# Patient Record
Sex: FEMALE | Race: WHITE | NOT HISPANIC OR LATINO | Employment: FULL TIME | URBAN - METROPOLITAN AREA
[De-identification: names, ages, dates, MRNs, and addresses within clinical notes are randomized per-mention and may not be internally consistent; named-entity substitution may affect disease eponyms.]

---

## 2017-03-06 ENCOUNTER — HOSPITAL ENCOUNTER (OUTPATIENT)
Facility: MEDICAL CENTER | Age: 27
End: 2017-03-06
Attending: PHYSICIAN ASSISTANT
Payer: COMMERCIAL

## 2017-03-06 ENCOUNTER — OFFICE VISIT (OUTPATIENT)
Dept: URGENT CARE | Facility: PHYSICIAN GROUP | Age: 27
End: 2017-03-06
Payer: COMMERCIAL

## 2017-03-06 VITALS
TEMPERATURE: 98.8 F | BODY MASS INDEX: 20.89 KG/M2 | HEART RATE: 84 BPM | SYSTOLIC BLOOD PRESSURE: 102 MMHG | DIASTOLIC BLOOD PRESSURE: 74 MMHG | WEIGHT: 130 LBS | HEIGHT: 66 IN | RESPIRATION RATE: 16 BRPM | OXYGEN SATURATION: 97 %

## 2017-03-06 DIAGNOSIS — R10.30 LOWER ABDOMINAL PAIN: ICD-10-CM

## 2017-03-06 DIAGNOSIS — Z3A.01 LESS THAN 8 WEEKS GESTATION OF PREGNANCY: ICD-10-CM

## 2017-03-06 LAB
APPEARANCE UR: CLEAR
BILIRUB UR STRIP-MCNC: NEGATIVE MG/DL
COLOR UR AUTO: YELLOW
GLUCOSE UR STRIP.AUTO-MCNC: NEGATIVE MG/DL
INT CON NEG: NEGATIVE
INT CON POS: POSITIVE
KETONES UR STRIP.AUTO-MCNC: NEGATIVE MG/DL
LEUKOCYTE ESTERASE UR QL STRIP.AUTO: NEGATIVE
NITRITE UR QL STRIP.AUTO: NEGATIVE
PH UR STRIP.AUTO: 6 [PH] (ref 5–8)
POC URINE PREGNANCY TEST: POSITIVE
PROT UR QL STRIP: NEGATIVE MG/DL
RBC UR QL AUTO: NEGATIVE
SP GR UR STRIP.AUTO: 1
UROBILINOGEN UR STRIP-MCNC: NEGATIVE MG/DL

## 2017-03-06 PROCEDURE — 99214 OFFICE O/P EST MOD 30 MIN: CPT | Performed by: PHYSICIAN ASSISTANT

## 2017-03-06 PROCEDURE — 81025 URINE PREGNANCY TEST: CPT | Performed by: PHYSICIAN ASSISTANT

## 2017-03-06 PROCEDURE — 81002 URINALYSIS NONAUTO W/O SCOPE: CPT | Performed by: PHYSICIAN ASSISTANT

## 2017-03-06 PROCEDURE — 87086 URINE CULTURE/COLONY COUNT: CPT

## 2017-03-06 NOTE — MR AVS SNAPSHOT
"        Padmaja Berman   3/6/2017 6:15 PM   Office Visit   MRN: [id number]    Department:  [facility]   Dept Phone:  [phone]    Description:  Female : 1990   Provider:  Marge Hodgson PA-C           Reason for Visit     Urinary Pain Lower abdominal pain, slight pain from urinating. x1 week off and on      Allergies as of 3/6/2017     Allergen Noted Reactions    Nkda [No Known Drug Allergy] 2011         You were diagnosed with     Lower abdominal pain   [[id number]]         Vital Signs     Blood Pressure Pulse Temperature Respirations Height Weight    102/74 mmHg 84 37.1 °C (98.8 °F) 16 1.676 m (5' 5.98\") 58.968 kg (130 lb)    Body Mass Index Oxygen Saturation Last Menstrual Period Breastfeeding? Smoking Status       20.99 kg/m2 97% 2011 No Never Smoker        Basic Information     Date Of Birth Sex Race Ethnicity Preferred Language    1990 Female White Non- English      Problem List              ICD-10-CM Priority Class Noted - Resolved    Encounter for supervision of other normal pregnancy Z34.80   2011 - Present    Short cervix affecting pregnancy O26.879   10/21/2011 - Present    Thrombocytopenia complicating pregnancy (CMS-HCC) O99.119, D69.6   2011 - Present    Pyelonephritis N12   9/15/2016 - Present    Anxiety F41.9   2016 - Present      Health Maintenance        Date Due Completion Dates    IMM HPV VACCINE (1 of 3 - Female 3 Dose Series) 2002 ---    IMM VARICELLA (CHICKENPOX) VACCINE (1 of 2 - 2 Dose Adolescent Series) 2004 ---    IMM HEP A VACCINE (2 of 2 - Standard Series) 2009    PAP SMEAR 2012    IMM DTaP/Tdap/Td Vaccine (7 - Td) 2016, 1996, 8/3/1993, 11/10/1992, 1992, 1991    IMM INFLUENZA (1) 2016 ---            Results     POCT Urinalysis      Component Value Standard Range & Units    POC Color Yellow Negative    POC Appearance Clear Negative    POC Leukocyte " Esterase Negative Negative    POC Nitrites Negative Negative    POC Urobiligen Negative Negative (0.2) mg/dL    POC Protein Negative Negative mg/dL    POC Urine PH 6.0 5.0 - 8.0    POC Blood Negative Negative    POC Specific Gravity 1.005 <1.005 - >1.030    POC Ketones Negative Negative mg/dL    POC Biliruben Negative Negative mg/dL    POC Glucose Negative Negative mg/dL                POCT Pregnancy      Component Value Standard Range & Units    POC Urine Pregnancy Test Positive Negative    Internal Control Positive Positive     Internal Control Negative Negative                         Current Immunizations     Dtap Vaccine 5/2/1996, 8/3/1993, 11/10/1992, 4/1/1992, 1990    HIB Vaccine (ACTHIB/HIBERIX) 4/26/1993, 11/10/1992, 4/1/1992, 1991    Hepatitis A Vaccine, Ped/Adol 8/14/2008    Hepatitis B Vaccine Non-Recombivax (Ped/Adol) 4/21/2004, 1/20/2004, 10/16/2003    MMR Vaccine 5/2/1996, 4/26/1993    OPV - Historical Data 5/2/1996, 4/26/1993, 4/1/1992, 1991    Tdap Vaccine 5/12/2006      Below and/or attached are the medications your provider expects you to take. Review all of your home medications and newly ordered medications with your provider and/or pharmacist. Follow medication instructions as directed by your provider and/or pharmacist. Please keep your medication list with you and share with your provider. Update the information when medications are discontinued, doses are changed, or new medications (including over-the-counter products) are added; and carry medication information at all times in the event of emergency situations     Allergies:  NKDA - (reactions not documented)               Medications  Valid as of: March 06, 2017 -  7:56 PM    Generic Name Brand Name Tablet Size Instructions for use    ALPRAZolam (Tab) XANAX 0.25 MG Take 1 Tab by mouth at bedtime as needed for Sleep or Anxiety.        Ginger (Zingiber officinalis)   Take  by mouth.        Ibuprofen (Tab) MOTRIN 800 MG  Take 1 Tab by mouth every 8 hours as needed (Cramping).        Ondansetron HCl (Tab) ZOFRAN 4 MG Take 1 Tab by mouth every four hours as needed for Nausea/Vomiting.        OxyCODONE HCl (Tab) ROXICODONE 5 MG Take 1 Tab by mouth every four hours as needed for Severe Pain.        Phenazopyridine HCl (Tab) PYRIDIUM 200 MG Take 1 Tab by mouth 3 times a day as needed.        Prenatal Vit-Fe Fumarate-FA   Take  by mouth.        .                 Medicines prescribed today were sent to:     [facility] #[id number] - [place] - [address] [place] [address]    Phone: [phone] Fax: [phone]    Open 24 Hours?: No      Medication refill instructions:       If your prescription bottle indicates you have medication refills left, it is not necessary to call your provider’s office. Please contact your pharmacy and they will refill your medication.    If your prescription bottle indicates you do not have any refills left, you may request refills at any time through one of the following ways: The online [employer] system (except Urgent Care), by calling your provider’s office, or by asking your pharmacy to contact your provider’s office with a refill request. Medication refills are processed only during regular business hours and may not be available until the next business day. Your provider may request additional information or to have a follow-up visit with you prior to refilling your medication.   *Please Note: Medication refills are assigned a new Rx number when refilled electronically. Your pharmacy may indicate that no refills were authorized even though a new prescription for the same medication is available at the pharmacy. Please request the medicine by name with the pharmacy before contacting your provider for a refill.        Your To Do List     Future Labs/Procedures Complete By Expires    URINE CULTURE(NEW)  As directed 3/6/2018    US-OB PELVIS TRANSVAGINAL  As directed 3/6/2018         [employer]  Access Code: [email or web address]  Expires: 3/31/2017  2:30 PM    [employer]  A secure, online tool to manage your health information     Your [employer]’s [employer]® is a secure, online tool that connects you to your personalized health information from the privacy of your home -- day or night - making it very easy for you to manage your healthcare. Once the activation process is completed, you can even access your medical information using the [employer] valeriano, which is available for free in the Apple Valeriano store or Google Play store.     [employer] provides the following levels of access (as shown below):   My Chart Features   [facility] Primary Care Doctor [facility]  Specialists [facility]  Urgent  Care Non-[facility]  Primary Care  Doctor   Email your healthcare team securely and privately 24/7 X X X    Manage appointments: schedule your next appointment; view details of past/upcoming appointments X      Request prescription refills. X      View recent personal medical records, including lab and immunizations X X X X   View health record, including health history, allergies, medications X X X X   Read reports about your outpatient visits, procedures, consult and ER notes X X X X   See your discharge summary, which is a recap of your hospital and/or ER visit that includes your diagnosis, lab results, and care plan. X X       How to register for [employer]:  1. Go to  [email or web address].  2. Click on the Sign Up Now box, which takes you to the New Member Sign Up page. You will need to provide the following information:  a. Enter your [employer] Access Code exactly as it appears at the top of this page. (You will not need to use this code after you’ve completed the sign-up process. If you do not sign up before the expiration date, you must request a new code.)   b. Enter your date of birth.   c. Enter your home email address.   d. Click Submit, and follow the next screen’s instructions.  3. Create a [employer] ID. This will be your [employer] login ID  and cannot be changed, so think of one that is secure and easy to remember.  4. Create a [employer]fy password. You can change your password at any time.  5. Enter your Password Reset Question and Answer. This can be used at a later time if you forget your password.   6. Enter your e-mail address. This allows you to receive e-mail notifications when new information is available in simfy.  7. Click Sign Up. You can now view your health information.    For assistance activating your [employer] account, call [phone]

## 2017-03-07 ENCOUNTER — HOSPITAL ENCOUNTER (OUTPATIENT)
Dept: RADIOLOGY | Facility: MEDICAL CENTER | Age: 27
End: 2017-03-07
Attending: PHYSICIAN ASSISTANT
Payer: COMMERCIAL

## 2017-03-07 DIAGNOSIS — R10.30 LOWER ABDOMINAL PAIN: ICD-10-CM

## 2017-03-07 PROCEDURE — 76817 TRANSVAGINAL US OBSTETRIC: CPT

## 2017-03-07 NOTE — PROGRESS NOTES
Chief Complaint   Patient presents with   • Urinary Pain     Lower abdominal pain, slight pain from urinating. x1 week off and on        HISTORY OF PRESENT ILLNESS: Patient is a 26 y.o. female who presents today for 1 week of intermittent/unpredictable lower abdominal pain/stabbing pain lasting about 5-10 mins at a time.  Patient is currently approx 8 weeks pregnant.  LMP end of December.   She states she is not having any urinary burning, urgency and frequency of urination but was worried particularly about UTI as she had one in the past that went undetected.  She is  and does not have hx of complication in her first pregnancy. .    She is not having any vaginal bleeding or spotting.   Some intermittent nausea.  No vomiting.   No changes in bowels.   First OB appt in 17.     Patient Active Problem List    Diagnosis Date Noted   • Anxiety 2016   • Pyelonephritis 09/15/2016   • Thrombocytopenia complicating pregnancy (CMS-HCC) 2011   • Short cervix affecting pregnancy 10/21/2011   • Encounter for supervision of other normal pregnancy 2011       Allergies:Nkda    Current Outpatient Prescriptions Ordered in [facility]   Medication Sig Dispense Refill   • Ginger, Zingiber officinalis, (GINGER PO) Take  by mouth.     • Prenatal Vit-Fe Fumarate-FA (BL PRENATAL VITAMINS PO) Take  by mouth.     • alprazolam (XANAX) 0.25 MG Tab Take 1 Tab by mouth at bedtime as needed for Sleep or Anxiety. 10 Tab 0   • oxycodone immediate-release (ROXICODONE) 5 MG Tab Take 1 Tab by mouth every four hours as needed for Severe Pain. 20 Tab 0   • ondansetron (ZOFRAN) 4 MG Tab tablet Take 1 Tab by mouth every four hours as needed for Nausea/Vomiting. 20 Tab 1   • phenazopyridine (PYRIDIUM) 200 MG Tab Take 1 Tab by mouth 3 times a day as needed. 6 Tab 0   • ibuprofen (MOTRIN) 800 MG TABS Take 1 Tab by mouth every 8 hours as needed (Cramping). 40 Each 3     No current Epic-ordered facility-administered medications on file.  "      Past Medical History   Diagnosis Date   • Thrombocytopenia complicating pregnancy 2011   • N&V (nausea and vomiting) 2011   • Pyelonephritis 9/15/2016   • Anxiety 2016       Social History   Substance Use Topics   • Smoking status: Never Smoker    • Smokeless tobacco: Never Used   • Alcohol Use: No       Family Status   Relation Status Death Age   • Maternal Grandfather Alive    • Mother Alive    • Father Alive    • Maternal Grandmother Alive    • Paternal Grandmother       Emphysema   • Paternal Grandfather Alive    • Sister Alive    • Brother Alive      Family History   Problem Relation Age of Onset   • Cancer Maternal Grandfather      skin   • Lung Disease Paternal Grandmother      Emphysema   • Heart Disease Paternal Grandfather      MI x 3       ROS:  Review of Systems   Constitutional: Negative for fever, chills, weight loss and malaise/fatigue.   HENT: Negative for ear pain, nosebleeds, congestion, sore throat and neck pain.    Eyes: Negative for blurred vision.   Respiratory: Negative for cough, sputum production, shortness of breath and wheezing.    Cardiovascular: Negative for chest pain, palpitations, orthopnea and leg swelling.   Gastrointestinal: SEE HPI  Genitourinary: SEE HPI  All other systems reviewed and are negative.       Exam:  Blood pressure 102/74, pulse 84, temperature 37.1 °C (98.8 °F), resp. rate 16, height 1.676 m (5' 5.98\"), weight 58.968 kg (130 lb), last menstrual period 2011, SpO2 97 %, not currently breastfeeding.  General:  Well nourished, well developed female in NAD  Eyes: PERRLA, EOM within normal limits, no conjunctival injection, no scleral icterus, visual fields and acuity grossly intact.  Mouth: reasonable hygiene, no erythema exudates or tonsillar enlargement.  Neck: no masses, range of motion within normal limits, no tracheal deviation. No lymphadenopathy  Pulmonary: Normal respiratory effort, no wheezes, crackles, or " rhonchi.  Cardiovascular: regular rate and rhythm without murmurs, rubs, or gallops.  Abdomen: Soft, nontender, nondistended. Normal bowel sounds. No hepatosplenomegaly or masses, or hernias. No rebound or guarding.  No CVA tenderness.   Skin: No visible rashes or lesion. Warm, pink, dry.   Extremities: no clubbing, cyanosis, or edema.  Neuro: A&O x 3. Speech normal/clear.  Normal gait.     Component Results      Component Value Ref Range & Units Status     POC Urine Pregnancy Test Positive Negative Final     Internal Control Positive Positive  Final     Internal Control Negative Negative  Final     Component Results      Component Value Ref Range & Units Status     POC Color Yellow Negative Final     POC Appearance Clear Negative Final     POC Leukocyte Esterase Negative Negative Final     POC Nitrites Negative Negative Final     POC Urobiligen Negative Negative (0.2) mg/dL Final     POC Protein Negative Negative mg/dL Final     POC Urine PH 6.0 5.0 - 8.0 Final     POC Blood Negative Negative Final     POC Specific Gravity 1.005 <1.005 - >1.030 Final     POC Ketones Negative Negative mg/dL Final     POC Biliruben Negative Negative mg/dL Final     POC Glucose Negative Negative mg/dL Final     Impression        1.  Single intrauterine pregnancy with a sonographic age of 7 weeks 3 days and an estimated date of delivery of 10/21/2017  2.  LEFT ovarian hemorrhagic or corpus luteum cyst         Reading Provider Reading Date     Ronn Israel M.D. Mar 7, 2017       Assessment/Plan:  1. Lower abdominal pain  POCT Urinalysis    POCT Pregnancy    US-OB PELVIS TRANSVAGINAL    URINE CULTURE(NEW)   2. Less than 8 weeks gestation of pregnancy         -labs/imaging as above.  IUP at 7 weeks, 3 days.   -spoke with patient following day 03/07/17 and she was denying pain that day.   Has appt with OB on 03/14/17 for initial visit.   -continue to stay well hydrated and monitor symptoms carefully.  ER precautions for increasing  pain/vaginal bleeding or spotting or any change in character of current symptoms     Supportive care, differential diagnoses, and indications for immediate follow-up discussed with patient.   Pathogenesis of diagnosis discussed including typical length and natural progression.   Instructed to return to clinic or nearest emergency department for any change in condition, further concerns, or worsening of symptoms.  Patient states understanding of the plan of care and discharge instructions.      Marge Hodgson PA-C

## 2017-03-09 LAB
BACTERIA UR CULT: NORMAL
SIGNIFICANT IND 70042: NORMAL
SOURCE SOURCE: NORMAL

## 2017-04-11 ENCOUNTER — HOSPITAL ENCOUNTER (OUTPATIENT)
Dept: LAB | Facility: MEDICAL CENTER | Age: 27
End: 2017-04-11
Attending: OBSTETRICS & GYNECOLOGY
Payer: COMMERCIAL

## 2017-04-11 PROCEDURE — 84702 CHORIONIC GONADOTROPIN TEST: CPT

## 2017-04-11 PROCEDURE — 84163 PAPPA SERUM: CPT

## 2017-04-11 PROCEDURE — 36415 COLL VENOUS BLD VENIPUNCTURE: CPT

## 2017-05-05 ENCOUNTER — HOSPITAL ENCOUNTER (OUTPATIENT)
Dept: LAB | Facility: MEDICAL CENTER | Age: 27
End: 2017-05-05
Attending: OBSTETRICS & GYNECOLOGY
Payer: COMMERCIAL

## 2017-05-05 PROCEDURE — 84702 CHORIONIC GONADOTROPIN TEST: CPT

## 2017-05-05 PROCEDURE — 82105 ALPHA-FETOPROTEIN SERUM: CPT

## 2017-05-05 PROCEDURE — 36415 COLL VENOUS BLD VENIPUNCTURE: CPT

## 2017-05-05 PROCEDURE — 86336 INHIBIN A: CPT

## 2017-05-05 PROCEDURE — 82677 ASSAY OF ESTRIOL: CPT

## 2017-05-08 LAB
# FETUSES US: 1
# FETUSES US: 1
ADDITIONAL US Z4552: NORMAL
ADDITIONAL US Z4552: NORMAL
AFP ADJ MOM SERPL: 0.92
AFP SERPL-MCNC: 31.6 NG/ML
AGE AT DELIVERY: 26.1 YEARS
AGE AT DELIVERY: 26.1 YEARS
COLLECT DATE: NORMAL
COLLECT DATE: NORMAL
COMMENT  Z4564: NORMAL
COMMENT  Z4564: NORMAL
FET CRL US.MEAS: 64 MM
FET CRL US.MEAS: 64 MM
FET NUCHAL FOLD MOM THICKNESS US.MEAS: 1.38
FET NUCHAL FOLD MOM THICKNESS US.MEAS: 1.38
FET NUCHAL FOLD THICKNESS US.MEAS: 2.3 MM
FET NUCHAL FOLD THICKNESS US.MEAS: 2.3 MM
FET TS 18 RISK FROM MAT AGE: NORMAL
FET TS 18 RISK FROM MAT AGE: NORMAL
FET TS 21 RISK FROM MAT AGE: NORMAL
FET TS 21 RISK FROM MAT AGE: NORMAL
GA: 12.7 WEEKS
GA: 12.7 WEEKS
GA: 16.1 WEEKS
HCG ADJ MOM SERPL: 1.22
HCG ADJ MOM SERPL: 1.35
HCG SERPL-ACNC: 127.9 IU/ML
HCG SERPL-ACNC: 46 IU/ML
IDDM PATIENT QL: NO
INHIBIN A ADJ MOM SERPL: 1.56
INHIBIN A SERPL-MCNC: 301.3 PG/ML
INTEGRATED SCN PATIENT-IMP: NORMAL
NEURAL TUBE DEFECT RISK FETUS: NORMAL %
NOTE Z4565: NORMAL
NOTE Z4565: NORMAL
PAPP-A MOM SERPL: 1.5
PAPP-A MOM SERPL: 1.5
PAPP-A SERPL-MCNC: 1839.3 NG/ML
PAPP-A SERPL-MCNC: 1839.3 NG/ML
RESULTS Z4535: NORMAL
RESULTS Z4535: NORMAL
SONOGRAPHER: NORMAL
SONOGRAPHER: NORMAL
SUBMIT PART2 SAMPLE USING Z4537: NORMAL
TS 18 RISK FETUS: NORMAL
TS 18 RISK FETUS: NORMAL
TS 21 RISK FETUS: NORMAL
TS 21 RISK FETUS: NORMAL
U ESTRIOL ADJ MOM SERPL: 1.18
U ESTRIOL SERPL-MCNC: 1.02 NG/ML
US DATE: NORMAL
US DATE: NORMAL

## 2018-06-27 ENCOUNTER — OFFICE VISIT (OUTPATIENT)
Dept: URGENT CARE | Facility: PHYSICIAN GROUP | Age: 28
End: 2018-06-27
Payer: COMMERCIAL

## 2018-06-27 VITALS
OXYGEN SATURATION: 97 % | RESPIRATION RATE: 18 BRPM | HEIGHT: 66 IN | WEIGHT: 135 LBS | DIASTOLIC BLOOD PRESSURE: 78 MMHG | TEMPERATURE: 99.6 F | HEART RATE: 74 BPM | BODY MASS INDEX: 21.69 KG/M2 | SYSTOLIC BLOOD PRESSURE: 118 MMHG

## 2018-06-27 DIAGNOSIS — H66.001 ACUTE SUPPURATIVE OTITIS MEDIA OF RIGHT EAR WITHOUT SPONTANEOUS RUPTURE OF TYMPANIC MEMBRANE, RECURRENCE NOT SPECIFIED: Primary | ICD-10-CM

## 2018-06-27 DIAGNOSIS — J02.9 SORE THROAT: ICD-10-CM

## 2018-06-27 LAB
INT CON NEG: NORMAL
INT CON POS: NORMAL
S PYO AG THROAT QL: NEGATIVE

## 2018-06-27 PROCEDURE — 87880 STREP A ASSAY W/OPTIC: CPT | Performed by: PHYSICIAN ASSISTANT

## 2018-06-27 PROCEDURE — 99214 OFFICE O/P EST MOD 30 MIN: CPT | Performed by: PHYSICIAN ASSISTANT

## 2018-06-27 RX ORDER — AMOXICILLIN 875 MG/1
875 TABLET, COATED ORAL 2 TIMES DAILY
Qty: 20 TAB | Refills: 0 | Status: SHIPPED | OUTPATIENT
Start: 2018-06-27

## 2018-06-27 NOTE — LETTER
June 27, 2018         Patient: Padmaja Berman   YOB: 1990   Date of Visit: 6/27/2018           To Whom it May Concern:    Padmaja Berman was seen in my clinic on 6/27/2018. She may return to work on 6/29/2018.    If you have any questions or concerns, please don't hesitate to call.        Sincerely,           Yulia Soto P.A.-C.  Electronically Signed

## 2018-06-28 NOTE — PROGRESS NOTES
Subjective:      Padmaja Berman is a 27 y.o. female who presents with Sore Throat (chills, body aches X today )    PMH:  has a past medical history of Anxiety (9/16/2016); N&V (nausea and vomiting) (7/29/2011); Pyelonephritis (9/15/2016); and Thrombocytopenia complicating pregnancy (HCC) (11/14/2011).  MEDS:   Current Outpatient Prescriptions:   •  Ginger, Zingiber officinalis, (GINGER PO), Take  by mouth., Disp: , Rfl:   •  alprazolam (XANAX) 0.25 MG Tab, Take 1 Tab by mouth at bedtime as needed for Sleep or Anxiety., Disp: 10 Tab, Rfl: 0  •  oxycodone immediate-release (ROXICODONE) 5 MG Tab, Take 1 Tab by mouth every four hours as needed for Severe Pain., Disp: 20 Tab, Rfl: 0  •  ondansetron (ZOFRAN) 4 MG Tab tablet, Take 1 Tab by mouth every four hours as needed for Nausea/Vomiting., Disp: 20 Tab, Rfl: 1  •  phenazopyridine (PYRIDIUM) 200 MG Tab, Take 1 Tab by mouth 3 times a day as needed., Disp: 6 Tab, Rfl: 0  •  ibuprofen (MOTRIN) 800 MG TABS, Take 1 Tab by mouth every 8 hours as needed (Cramping)., Disp: 40 Each, Rfl: 3  •  Prenatal Vit-Fe Fumarate-FA (BL PRENATAL VITAMINS PO), Take  by mouth., Disp: , Rfl:   ALLERGIES:   Allergies   Allergen Reactions   • Nkda [No Known Drug Allergy]      SURGHX: No past surgical history on file.  SOCHX:  reports that she has never smoked. She has never used smokeless tobacco. She reports that she does not drink alcohol or use drugs.  FH: Reviewed with patient/family. Not pertinent to this complaint.          Patient presents with:  Sore Throat: chills, body aches X today, right ear also feels plugged, hurts to swallow.            Pharyngitis    This is a new problem. The current episode started yesterday. The problem has been rapidly worsening. The pain is worse on the right side. There has been no fever. The pain is at a severity of 7/10. Associated symptoms include ear pain, headaches, a hoarse voice, a plugged ear sensation, swollen glands and trouble swallowing.  "Pertinent negatives include no congestion, coughing, ear discharge or stridor. She has had exposure to strep. She has tried cool liquids, gargles and NSAIDs for the symptoms. The treatment provided no relief.       Review of Systems   Constitutional: Positive for chills.   HENT: Positive for ear pain, hoarse voice, sore throat and trouble swallowing. Negative for congestion, ear discharge and sinus pain.    Respiratory: Negative for cough and stridor.    Neurological: Positive for headaches. Negative for dizziness.   All other systems reviewed and are negative.         Objective:     /78   Pulse 74   Temp 37.6 °C (99.6 °F)   Resp 18   Ht 1.676 m (5' 5.98\")   Wt 61.2 kg (135 lb)   LMP 05/02/2011 Comment: not sure, normal length/heaviness  SpO2 97%   BMI 21.80 kg/m²      Physical Exam   Constitutional: She is oriented to person, place, and time. She appears well-developed and well-nourished. No distress.   HENT:   Head: Normocephalic and atraumatic.   Right Ear: Tympanic membrane is injected, erythematous and bulging. A middle ear effusion is present.   Left Ear: Tympanic membrane normal.   Nose: Nose normal.   Mouth/Throat: Uvula is midline, oropharynx is clear and moist and mucous membranes are normal. No tonsillar exudate.   Eyes: Conjunctivae and EOM are normal. Pupils are equal, round, and reactive to light.   Neck: Normal range of motion. Neck supple.   Cardiovascular: Normal rate.    Pulmonary/Chest: Effort normal.   Musculoskeletal: Normal range of motion.   Neurological: She is alert and oriented to person, place, and time.   Skin: Skin is warm and dry. Capillary refill takes less than 2 seconds.   Psychiatric: She has a normal mood and affect.   Nursing note and vitals reviewed.         Strep: neg     Assessment/Plan:     1. Acute suppurative otitis media of right ear without spontaneous rupture of tympanic membrane, recurrence not specified  amoxicillin (AMOXIL) 875 MG tablet   2. Sore throat  " POCT Rapid Strep A    amoxicillin (AMOXIL) 875 MG tablet     PT advised saltwater gargles/swishes  3-4 times daily until symptoms improve.     PT can continue OTC medications, increase fluids and rest until symptoms improve.     PT should follow up with PCP in 1-2 days for re-evaluation if symptoms have not improved.  Discussed red flags and reasons to return to UC or ED.  Pt and/or family verbalized understanding of diagnosis and follow up instructions and was offered informational handout on diagnosis.  PT discharged.

## 2018-06-30 ASSESSMENT — ENCOUNTER SYMPTOMS
HOARSE VOICE: 1
DIZZINESS: 0
SORE THROAT: 1
SWOLLEN GLANDS: 1
CHILLS: 1
COUGH: 0
TROUBLE SWALLOWING: 1
SINUS PAIN: 0
HEADACHES: 1
STRIDOR: 0